# Patient Record
Sex: MALE | Race: OTHER | URBAN - METROPOLITAN AREA
[De-identification: names, ages, dates, MRNs, and addresses within clinical notes are randomized per-mention and may not be internally consistent; named-entity substitution may affect disease eponyms.]

---

## 2017-06-17 ENCOUNTER — EMERGENCY (EMERGENCY)
Facility: HOSPITAL | Age: 67
LOS: 1 days | Discharge: HOME | End: 2017-06-17
Attending: INTERNAL MEDICINE | Admitting: INTERNAL MEDICINE
Payer: SELF-PAY

## 2017-06-17 VITALS
HEART RATE: 77 BPM | OXYGEN SATURATION: 96 % | TEMPERATURE: 99 F | SYSTOLIC BLOOD PRESSURE: 104 MMHG | RESPIRATION RATE: 16 BRPM | WEIGHT: 166.89 LBS | DIASTOLIC BLOOD PRESSURE: 68 MMHG | HEIGHT: 70 IN

## 2017-06-17 VITALS — TEMPERATURE: 99 F

## 2017-06-17 DIAGNOSIS — E11.65 TYPE 2 DIABETES MELLITUS WITH HYPERGLYCEMIA: ICD-10-CM

## 2017-06-17 DIAGNOSIS — K21.9 GASTRO-ESOPHAGEAL REFLUX DISEASE WITHOUT ESOPHAGITIS: ICD-10-CM

## 2017-06-17 DIAGNOSIS — Z79.4 LONG TERM (CURRENT) USE OF INSULIN: ICD-10-CM

## 2017-06-17 DIAGNOSIS — F31.9 BIPOLAR DISORDER, UNSPECIFIED: ICD-10-CM

## 2017-06-17 PROCEDURE — 71010: CPT | Mod: 26

## 2017-06-17 PROCEDURE — 99053 MED SERV 10PM-8AM 24 HR FAC: CPT

## 2017-06-17 PROCEDURE — 71045 X-RAY EXAM CHEST 1 VIEW: CPT

## 2017-06-17 PROCEDURE — 99283 EMERGENCY DEPT VISIT LOW MDM: CPT | Mod: 25

## 2017-06-17 RX ORDER — ATORVASTATIN CALCIUM 80 MG/1
1 TABLET, FILM COATED ORAL
Qty: 0 | Refills: 0 | COMMUNITY

## 2017-06-17 RX ORDER — PANTOPRAZOLE SODIUM 20 MG/1
1 TABLET, DELAYED RELEASE ORAL
Qty: 0 | Refills: 0 | COMMUNITY

## 2017-06-17 RX ORDER — ACETAMINOPHEN 500 MG
2 TABLET ORAL
Qty: 0 | Refills: 0 | COMMUNITY

## 2017-06-17 RX ORDER — INSULIN GLARGINE 100 [IU]/ML
5 INJECTION, SOLUTION SUBCUTANEOUS
Qty: 0 | Refills: 0 | COMMUNITY

## 2017-06-17 RX ORDER — ESCITALOPRAM OXALATE 10 MG/1
1.5 TABLET, FILM COATED ORAL
Qty: 0 | Refills: 0 | COMMUNITY

## 2017-06-17 RX ORDER — INSULIN LISPRO 100/ML
0 VIAL (ML) SUBCUTANEOUS
Qty: 0 | Refills: 0 | COMMUNITY

## 2017-06-17 RX ORDER — ACETAMINOPHEN 500 MG
1 TABLET ORAL
Qty: 0 | Refills: 0 | COMMUNITY

## 2017-06-17 RX ORDER — LEVETIRACETAM 250 MG/1
3 TABLET, FILM COATED ORAL
Qty: 0 | Refills: 0 | COMMUNITY

## 2017-06-17 RX ORDER — DEXAMETHASONE 0.5 MG/5ML
1 ELIXIR ORAL
Qty: 0 | Refills: 0 | COMMUNITY

## 2017-06-17 RX ORDER — SODIUM CHLORIDE 9 MG/ML
1000 INJECTION INTRAMUSCULAR; INTRAVENOUS; SUBCUTANEOUS
Qty: 0 | Refills: 0 | Status: DISCONTINUED | OUTPATIENT
Start: 2017-06-17 | End: 2017-06-21

## 2017-06-17 NOTE — ED PROVIDER NOTE - OBJECTIVE STATEMENT
68 y/o [place] man h/o IDDM, Bipolar disorder GERD HLD, he was referred for elevated glucose, he was treated with short acting insulin just prior to arrival. His POC glucose in the ED is 262. He has no other symptoms and is refusing treatment. As the nurse was attempting to take blood he became aggressive and refused treatment.

## 2017-06-17 NOTE — ED ADULT NURSE NOTE - PMH
Bipolar 1 disorder    Diabetes mellitus    Fever    Gastro-esophageal reflux disease without esophagitis    Generalized anxiety disorder    Hyperlipidemia
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
## 2017-06-17 NOTE — ED ADULT NURSE REASSESSMENT NOTE - NS ED NURSE REASSESS COMMENT FT1
FS 262mg/dl.  pt refusing further treatment.  pt combative with lab draw, refusing treatment.  discussed pt with supervisor Jeannette at [facility].  Advised of pt condition and notifying his return.

## 2017-06-17 NOTE — ED PROVIDER NOTE - CONSTITUTIONAL, MLM
normal... , awake, alert, oriented to person, place, agitation and is refusing treatment  and in no apparent distress.

## 2021-06-05 NOTE — ED PROVIDER NOTE - CPE EDP [facility] NORM
Pt a&ox4 - forgetful at times, vss on ra, wc bound at baseline - history of MS, regular diet - feeder, turn and repo, oxycodone and lyrica for pain in BLE, chronic payne in place, refused rooke boots overnight - education provided, discharge pending MA application and placement, patient slept between cares.    normal...